# Patient Record
Sex: FEMALE | Race: BLACK OR AFRICAN AMERICAN | NOT HISPANIC OR LATINO | URBAN - METROPOLITAN AREA
[De-identification: names, ages, dates, MRNs, and addresses within clinical notes are randomized per-mention and may not be internally consistent; named-entity substitution may affect disease eponyms.]

---

## 2025-01-23 ENCOUNTER — TELEPHONE (OUTPATIENT)
Dept: PRIMARY CARE | Facility: CLINIC | Age: 55
End: 2025-01-23

## 2025-01-23 NOTE — TELEPHONE ENCOUNTER
Pt is out of town and wanting some advice....  Left leg pain, upper. When she sits the pain in left buttocks and leg is unbearable, front of thigh and like pelvic joint also hurts . Otc meds are not helping . Please advise . [phone]

## 2025-01-28 ENCOUNTER — TELEPHONE (OUTPATIENT)
Dept: PRIMARY CARE | Facility: CLINIC | Age: 55
End: 2025-01-28

## 2025-01-28 NOTE — TELEPHONE ENCOUNTER
Patient is calling in because she is out of town and had to cancel her flight home to do sicatica pain.  Patient states she spoke with you about this last week and was sent to urgent care where they gave patient an injection of steroid and Toradol for pain and sent home with steroid.  Patient is much worse and needs some advice what she should do.  I did inform patient she probably might have to go back to urgent care or ED.  Please advise.

## 2025-02-03 ENCOUNTER — APPOINTMENT (OUTPATIENT)
Dept: PRIMARY CARE | Facility: CLINIC | Age: 55
End: 2025-02-03
Payer: COMMERCIAL

## 2025-02-03 ENCOUNTER — HOSPITAL ENCOUNTER (OUTPATIENT)
Dept: RADIOLOGY | Facility: HOSPITAL | Age: 55
Discharge: HOME | End: 2025-02-03
Payer: COMMERCIAL

## 2025-02-03 VITALS
OXYGEN SATURATION: 100 % | HEART RATE: 100 BPM | DIASTOLIC BLOOD PRESSURE: 94 MMHG | WEIGHT: 165 LBS | BODY MASS INDEX: 29.23 KG/M2 | SYSTOLIC BLOOD PRESSURE: 136 MMHG

## 2025-02-03 DIAGNOSIS — M51.26 LUMBAR HERNIATED DISC: ICD-10-CM

## 2025-02-03 DIAGNOSIS — G83.4 CAUDA EQUINA SYNDROME (MULTI): ICD-10-CM

## 2025-02-03 DIAGNOSIS — M54.42 ACUTE LEFT-SIDED LOW BACK PAIN WITH LEFT-SIDED SCIATICA: ICD-10-CM

## 2025-02-03 PROCEDURE — 72148 MRI LUMBAR SPINE W/O DYE: CPT | Performed by: RADIOLOGY

## 2025-02-03 PROCEDURE — 1036F TOBACCO NON-USER: CPT | Performed by: PHYSICIAN ASSISTANT

## 2025-02-03 PROCEDURE — 72148 MRI LUMBAR SPINE W/O DYE: CPT

## 2025-02-03 PROCEDURE — 99214 OFFICE O/P EST MOD 30 MIN: CPT | Performed by: PHYSICIAN ASSISTANT

## 2025-02-03 RX ORDER — OXYCODONE AND ACETAMINOPHEN 7.5; 325 MG/1; MG/1
TABLET ORAL
COMMUNITY
Start: 2025-01-31 | End: 2025-02-03 | Stop reason: SDUPTHER

## 2025-02-03 RX ORDER — OXYCODONE AND ACETAMINOPHEN 7.5; 325 MG/1; MG/1
1 TABLET ORAL EVERY 6 HOURS PRN
Qty: 28 TABLET | Refills: 0 | Status: SHIPPED | OUTPATIENT
Start: 2025-02-03 | End: 2025-02-10

## 2025-02-03 RX ORDER — CYCLOBENZAPRINE HCL 10 MG
10 TABLET ORAL NIGHTLY
COMMUNITY
Start: 2025-01-29

## 2025-02-03 RX ORDER — PREDNISONE 5 MG/1
TABLET ORAL
Qty: 30 TABLET | Refills: 0 | Status: SHIPPED | OUTPATIENT
Start: 2025-02-03

## 2025-02-03 ASSESSMENT — ENCOUNTER SYMPTOMS
WEAKNESS: 1
BOWEL INCONTINENCE: 0
PARESTHESIAS: 1
BACK PAIN: 1
PERIANAL NUMBNESS: 0
NUMBNESS: 1
TINGLING: 1
ARTHRALGIAS: 1
MYALGIAS: 1

## 2025-02-03 ASSESSMENT — PAIN SCALES - GENERAL: PAINLEVEL_OUTOF10: 2

## 2025-02-03 NOTE — PROGRESS NOTES
Subjective   Patient ID: Heather Carey is a 54 y.o. female who presents for Back Pain (Follow up lower back pain, went to UC and ortho in [place]. They suspect a herniated disc at L3. No known injury, thinks she may have overdone it after working out. ).    Back Pain  This is a recurrent problem. The current episode started 1 to 4 weeks ago. The pain is present in the lumbar spine. The quality of the pain is described as burning. The pain radiates to the left thigh. The pain is moderate. Associated symptoms include numbness, paresthesias, tingling and weakness. Pertinent negatives include no bladder incontinence, bowel incontinence or perianal numbness.    She was out of town and her back pain got worse.  Started it a day after she did rows andn was informed to lean back and felt pain the next day.   Went to [facility]  on 1/23 and was given Medrol 4mg  and ketorolac with minimal relief. Then went to [facility] and was evaluated. Was prescribed Percocet 7.5mg/325mg and flexeril  She is not any better and was informed she has a herniated disc.  She can't sit down  due to the pain. She also lost feeling to urinate. Has not had a bm in a week.  Review of Systems   Gastrointestinal:  Negative for bowel incontinence.   Genitourinary:  Negative for bladder incontinence.   Musculoskeletal:  Positive for arthralgias, back pain and myalgias.   Neurological:  Positive for tingling, weakness, numbness and paresthesias.       Objective   BP (!) 136/94   Pulse 100   Wt 74.8 kg (165 lb)   SpO2 100%   BMI 29.23 kg/m²     Physical Exam  Constitutional:       Comments: Very Uncomfortable   Musculoskeletal:      Lumbar back: Spasms, tenderness and bony tenderness present.      Comments: Great difficulty walking on tiptoes and heels.  Has a lot of weakness left leg especially with flexion.   Neurological:      Mental Status: She is alert.      Sensory: Sensation is intact.      Motor: Weakness present.      Deep Tendon Reflexes:       Reflex Scores:       Patellar reflexes are 2+ on the right side and 2+ on the left side.       Achilles reflexes are 2+ on the right side and 2+ on the left side.     Comments: Difficulty walking due to the pain         Assessment/Plan   Diagnoses and all orders for this visit:  Lumbar herniated disc  -     predniSONE (Deltasone) 5 mg tablet; Take 5 tabs (25 mg) by mouth daily for 2 days, then 4 tabs (20 mg) daily for 2 days, then 3 tabs (15 mg) daily for 2 days, then 2 tabs (10 mg) daily for 2 days, then 1 tab (5 mg) daily for 2 days.  -     MR lumbar spine wo IV contrast; Future  Cauda equina syndrome (Multi)  -     MR lumbar spine wo IV contrast; Future  Acute left-sided low back pain with left-sided sciatica  -     MR lumbar spine wo IV contrast; Future    Patient needs a stat MRI to look at her cauda equina as well.  She was on a very short course of prednisone will send in a longer taper I will also refill her Percocet will have her follow-up pending her MRI results.

## 2025-02-13 ENCOUNTER — APPOINTMENT (OUTPATIENT)
Dept: PRIMARY CARE | Facility: CLINIC | Age: 55
End: 2025-02-13
Payer: COMMERCIAL

## 2025-02-13 VITALS
SYSTOLIC BLOOD PRESSURE: 132 MMHG | WEIGHT: 160 LBS | OXYGEN SATURATION: 100 % | TEMPERATURE: 98 F | DIASTOLIC BLOOD PRESSURE: 84 MMHG | HEART RATE: 96 BPM | BODY MASS INDEX: 28.34 KG/M2

## 2025-02-13 DIAGNOSIS — M51.26 LUMBAR HERNIATED DISC: Primary | ICD-10-CM

## 2025-02-13 DIAGNOSIS — M54.42 ACUTE LEFT-SIDED LOW BACK PAIN WITH LEFT-SIDED SCIATICA: ICD-10-CM

## 2025-02-13 PROBLEM — N60.19 FIBROCYSTIC BREAST DISEASE: Status: ACTIVE | Noted: 2020-08-14

## 2025-02-13 PROBLEM — H05.20 EXOPHTHALMOS: Status: ACTIVE | Noted: 2022-05-20

## 2025-02-13 PROCEDURE — 1036F TOBACCO NON-USER: CPT | Performed by: PHYSICIAN ASSISTANT

## 2025-02-13 PROCEDURE — 99213 OFFICE O/P EST LOW 20 MIN: CPT | Performed by: PHYSICIAN ASSISTANT

## 2025-02-13 ASSESSMENT — ENCOUNTER SYMPTOMS
NUMBNESS: 0
BACK PAIN: 1

## 2025-02-13 ASSESSMENT — PAIN SCALES - GENERAL: PAINLEVEL_OUTOF10: 0-NO PAIN

## 2025-02-13 NOTE — LETTER
February 13, 2025     Patient: Heather Carey   YOB: 1970   Date of Visit: 2/13/2025       To Whom It May Concern:    It is my medical opinion that Heather Carey  is to not participate in her personal training sessions until after clearance from her physical therapy.    If you have any questions or concerns, please don't hesitate to call.         Sincerely,        Naz Feng PA-C

## 2025-02-13 NOTE — PROGRESS NOTES
Subjective   Patient ID: Heather Carey is a 54 y.o. female who presents for Follow-up.    Back Pain  This is a new problem. The current episode started more than 1 month ago. The problem occurs intermittently. The problem has been gradually improving since onset. The pain is present in the lumbar spine. Pertinent negatives include no numbness.    She is doing much better. Still has some pain but much less.    Review of Systems   Musculoskeletal:  Positive for back pain. Negative for gait problem.   Neurological:  Negative for numbness.       Objective   /84   Pulse 96   Temp 36.7 °C (98 °F)   Wt 72.6 kg (160 lb)   SpO2 100%   BMI 28.34 kg/m²     Physical Exam  Musculoskeletal:      Lumbar back: Bony tenderness present. Decreased range of motion.      Comments: Sitting and leaning forward is uncomfortable for her.   Neurological:      General: No focal deficit present.      Mental Status: She is alert and oriented to person, place, and time. Mental status is at baseline.         Assessment/Plan   Diagnoses and all orders for this visit:  Lumbar herniated disc  -     Referral to Physical Therapy; Future  Acute left-sided low back pain with left-sided sciatica  -     Referral to Physical Therapy; Future    Referred to PT. Use heat and stretch.

## 2025-02-18 ENCOUNTER — EVALUATION (OUTPATIENT)
Dept: PHYSICAL THERAPY | Facility: CLINIC | Age: 55
End: 2025-02-18
Payer: COMMERCIAL

## 2025-02-18 DIAGNOSIS — M54.16 LEFT LUMBAR RADICULOPATHY: Primary | ICD-10-CM

## 2025-02-18 PROCEDURE — 97161 PT EVAL LOW COMPLEX 20 MIN: CPT | Mod: GP

## 2025-02-18 PROCEDURE — 97110 THERAPEUTIC EXERCISES: CPT | Mod: GP

## 2025-02-18 NOTE — PROGRESS NOTES
Physical Therapy Evaluation     Patient Name: Heather Carey  MRN: [id number]  Today's Date: 2/18/2025  Time Calculation  Start Time: 1143  Stop Time: 1240  Time Calculation (min): 57 min    PT Evaluation Time Entry  PT Evaluation (Low) Time Entry: 40  PT Therapeutic Procedures Time Entry  Therapeutic Exercise Time Entry: 16       Insurance:  Number of Treatments Authorized: 1/60 (DED 1700 80/20 COVERAGE 60 V A YR REF#[id number])          Current Problem:   1. 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  Functional disability from present episode/Primary goal for PT: Pain relief   Present symptoms: L glute, medial knee   Present since: Mid January 2025 and getting better  Commenced as a result of lifting and had pain next day  Symptoms at onset: L glute   Constant symptoms: L glute, L medial knee   Intermittent symptoms: none  Pain ranges from: 1-9/10   Pt describes pain as: Throbbing, ache, numbness (medial L knee)   Symptoms are worse with: Sitting- always, laying- sometimes, turning - sometimes, bending forward- always   Symptoms are better with:  Standing  Disturbed sleep: Yes   Previous episodes: Yes   Previous treatments: meds (prednisone) - helped   Imaging: MRI lumbar (2/03/25 See MedChart)   Cough/sneeze/strain None   Bladder: Yes, Dr aware/monitoring - MRI: no cauda equina compression   Red Flags: recent/major surgery - none , night pain - yes , accidents - none, unexplained weight loss - none  Medical history: None       Objective Findings:   Outcome Measures:   /50=26%   Posture: Fair  Correction: NE    Neuro screen:    Myotomes: Difficulty holding  contraction Left hip flexion, knee ext.  All other L2-S1 normal Bilaterally  Dermatomes: Hypersensitivity L1-L3. All other L2-S1 normal Bilaterally    Lumbar Spine Movement Loss - Nil/Min/Mod/Max limit or degrees  Flexion: Mod - ERP   Extension: Min - ERP   R SGIS: Nil  L SGIS: Nil - ERP     Repeated Movement Testing -  During/after/mechanical response:  Sx in standing: L glute, L thigh - paresthesia    RFIS: x10 x2 - Increase, NW, Imp baselines/MMTing  JUAQUIN: x10 - Increase, NW, better with ROM/amb  Sx in lying: L glute, L thigh - paresthesia    RFIL: x10 - NE, better with ROM check   REIL: x10 -I P low back, better with ROM     Treatment:   REIL x10   Educated pt on proper response to exercise, centralization/localization, produce/increase - NW principle, and assessment process.  Issued handout for HEP  HEP/med bridge:   - Prone Press Up  - 5-8 x daily - 7 x weekly - 1 sets - 10-20 reps   - Seated Correct Posture     Assessment: Pt presents to PT with complaint of LBP w/ radiculopathy into L LE that began January 2025 following working out. Pt's history and response to repeated movements makes provisional classification derangement w/ DP into ext. Pt will continue to be assessed over the next 3-5 sessions to confirm provisional classification and DP. Pt will benefit from skilled PT to address ROM/strength/functional limitations and pain noted during evaluation today.    Plan:  Problem List: Pain, Functional limitations, activity limitations, ROM limitations, Posture, Gait, Transfer, Activity Limitations, IADLS/ADLS/Self care  Goals:  STG:  Pt will demo proper position/use of lumbar roll by week 1  Pt will be compliant with HEP by week 1   Pt will verbalize proper symptom response w/ movements by week 1  Pt will tolerate sitting w/ lumbar support >/= 10 minutes w/o limitation due to symptoms by week 4   LBP/ L glute = 0-3/10 by week 4  LTG:  L/S ROM = nil limit in all directions by week 8  Pt will achieve a  clinically significant improvement in TYSHAWN by week 8  Pt will report >/= 80% improvement/progress towards PT goals by week 8  Pt will tolerate sitting w/ lumbar support >/= 30 minutes w/o limitation due to symptoms by week 8   Pt will report >/= 50-75% exercise int, duration, and freq compared prior to injury by week 8  Planned interventions:Ther ex, Neuromuscular re-ed, ther act, Manual, Education, Home program, Estim, Hot therapy, Cold therapy, Vaso  Planned visits: x 1 a week for 8 weeks for 8-6 visits   The POC was created, discussed, and agreed on with the patient.